# Patient Record
(demographics unavailable — no encounter records)

---

## 2024-10-24 NOTE — PHYSICAL EXAM
[Normal Breath Sounds] : Normal breath sounds [Normal Heart Sounds] : normal heart sounds [Calm] : calm [de-identified] : well developed male in no distress [de-identified] : benign [de-identified] : incision clean and closed, without swelling

## 2024-10-24 NOTE — HISTORY OF PRESENT ILLNESS
[de-identified] : left chest wall mass-s/p excision on 9/18/24 [de-identified] : Pt doing well, normal GI functioning, without pain or drainage from the area.

## 2025-02-04 NOTE — IMAGING
[de-identified] : Spine: Inspection/Palpation: No tenderness to palpation throughout Cervical/thoracic/lumbar spine. No bony stepoffs, No lesions.  Gait: Non-antalgic, able to perform bilateral toe and heel rise.  Able to perform tandem gait.    Range of Motion: Cervical Spine: Flexion to chin to chest minus 2 fingers , extension to 50 degrees,    Neurologic: Bilateral upper extremities 5/5 Deltoid/Biceps/Triceps/ Wrist Flexion/Wrist Extension/ / Intrinsics Except  Sensation intact to light touch C5-T1  Biceps/Triceps/Brachioradialis Reflex within normal limits  Negative Patrick's,  No inverted brachioradialis reflex  Positive spurlins on left    MRI C spine from [facility]  reviewed and interpreted indepdntly report as follows 1. Right paracentral disc herniation at C5-C6, with mass effect on the ventral thecal sac. 2. Mild posterior subluxation of the occiput with respect to the atlas, which is of uncertain clinical significance. This may be related to ligamentous laxity. Please correlate clinically. 3. Loss of the normal cervical lordosis, which may be related to muscle spasm/pain. 4. Maxillary sinus disease.   MRI Cervical spine reviewed and interpreted independently.  Agree with report as follows.  1. Straightening of the cervical lordosis and multilevel degenerative disc disease with asymmetric mild right  foraminal narrowing at C4-C5 and mild bilateral foraminal narrowing at C5-C6. 2. No acute fracture, cord impingement or exiting nerve root impingement within the cervical spine. 3. Uncovertebral degenerative changes asymmetric on the right at C4-C5.  EMG with C7 C8>C5 C6 radiculopathy ]

## 2025-02-04 NOTE — HISTORY OF PRESENT ILLNESS
[de-identified] : 56 M RHD with injury on 10/21/19 at work when trying to fix an unstuck bolt.  Has been having headaches and neck pain ever since.  Has pain radiating from neck to elbow.  Font of shoulder.  Pain into trap.  Pain in periscapular region as well.  Has gotten trigger point injections with some relief has been seeing pain management for this as well.    MRI was done in 2021.  never evaluated by spine surgery before.   No issues with balance.  No issues with fine motor movement or dexterity.  Has had JACKELYN injections in past with good relief.  Last 6 months ago.  At times right hand goes numb .  NO pain shooting down right arm.    currently not at work.   1/31/23  here today for follow up. Has not gotten EMG yet.  Still pending auth.  Tingling in 1st - 4th digit.   2/28/23  Here [place] for follow up of EMG.  Pain is same.  JACKELYN by pain management doctor was denied.   4/25/23 Here today for follow-up. JACKELYN by pain management denied again. PT denied.  Had RUFINO last month. 50% disability per patient.   6/20/23  here for follow-up.  [employer] approved for JACKELYN. PT denied.  Chiro approved.  Still with pain radiating to right arm and worst in 1-3 digits . Has multilevel HNP causing foraminal Stenosis at C5 and 6. EMG confirmed.  Also with carpal tunnel  7/25/23  Here today for follow up pain improved after JACKELYN last week.  Has mild headache.   Feeling good after injection.  Got capral tunnel injeciton from [facility].  Alleviated all pins and needles in fingers.   9/12/23  Had Jackelyn wtih pain management no relief.  Pain ow into left arm into left shoulder is biggest issue. still doing chiro.  Not approved for PT.   10/24/23  Pt approved for neck. would like to start that.  Approved for OOW until January 5/21/24  Here for FU and SLU.  neck pain. radiates int o shoulder blade at times. Numbness and tingling in hand improved after carpal tunnel injection.   2/4/25: Follow up chronic cervical radiculopathy. Patient reports wanting to close out worker's comp and needs paperwork filled out that he has brought with him.  Has been having increasing neck pain.  after small tasks.  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  Has been OOW since 8/13/2020.  Has not been able to return to work.

## 2025-02-04 NOTE — ASSESSMENT
[FreeTextEntry1] : 56 M discussion with patient at length regarding his symptoms. Given unable to try JESSE he would be a candidate for C4-6 ACDF. Also discussed that sine he has double crush prior to cervical surgery would like to get a hand consult to consider carpal tunnel release or injection so he knows how much is coming from his neck vs his hand. carpal tunnel symptoms have resolved but still with persistent Radic now worse to left shoulder and arm. Pain is severe.  With any increased Range of motion of neck or extended driving has increased pain.    not interested in surgical intervention at this time FU PRN   Has been OOW due to the physical nature of his job.  Can not return to pre injury  job., Could perform sedentary work but have not been able to provide a this current job.

## 2025-02-04 NOTE — WORK
[Has the patient reached Maximum Medical Improvement? If yes, indicate date___] : Yes, on [unfilled] [Was the competent medical cause of the injury] : was the competent medical cause of the injury [Are consistent with the injury] : are consistent with the injury [Consistent with my objective findings] : consistent with my objective findings [Severe Partial] : severe partial [] : Never [Sedentary Work] : sedentary work [de-identified] : Cervical Spine [de-identified] : 11.4, 11.5 ,11.7 [de-identified] : D [de-identified] :  spurlings positive no weakness or sensory deifict.,  C5-6 hnp

## 2025-04-22 NOTE — PHYSICAL EXAM
[Alert] : alert [Oriented x 3] : ~L oriented x 3 [Well Nourished] : well nourished [FreeTextEntry3] : Type II skin  No rash present  Photo on patient's phone shows a moderate pink urticarial plaque with linear streaks on the right lower shin area  Stroke test: 2+ flare 2+ wheal No pruritus

## 2025-04-22 NOTE — HISTORY OF PRESENT ILLNESS
[FreeTextEntry1] : Itchy rash [de-identified] : First visit for 58-year-old white male with a few month history of of a pruritic rash which only comes out at night.  Affects different locations.  Treated by primary care with pills that he took for 6 days with improvement.  Note: Rash is not present now

## 2025-04-22 NOTE — PLAN
[TextEntry] : Explanation about dermatographism and that the goal of treatment is absence of itch, not absence of rash  Start fexofenadine 180 mg p.o. once or twice a day as needed for itching (patient weighs 230 pounds)  Return as needed -